# Patient Record
Sex: FEMALE | Race: OTHER | HISPANIC OR LATINO
[De-identification: names, ages, dates, MRNs, and addresses within clinical notes are randomized per-mention and may not be internally consistent; named-entity substitution may affect disease eponyms.]

---
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
## 2021-01-05 LAB — SURGICAL PATHOLOGY STUDY: SIGNIFICANT CHANGE UP

## 2021-01-15 ENCOUNTER — NON-APPOINTMENT (OUTPATIENT)
Age: 21
End: 2021-01-15

## 2021-02-01 ENCOUNTER — NON-APPOINTMENT (OUTPATIENT)
Age: 21
End: 2021-02-01

## 2021-02-18 NOTE — CHART NOTE - NSCHARTNOTEFT_GEN_A_CORE
I, Cally Mullins MD attest that, to the best of my knowledge based on clinical presentation and findings documented in the medical record, this patient had morbid obesity with a BMI of 40.

## 2023-05-30 NOTE — OB RN PATIENT PROFILE - FLU SEASON?
Consent 1/Introductory Paragraph: The rationale for Mohs was explained to the patient and consent was obtained. The risks, benefits and alternatives to therapy were discussed in detail. Specifically, the risks of infection, scarring, bleeding, prolonged wound healing, incomplete removal, allergy to anesthesia, nerve injury and recurrence were addressed. Prior to the procedure, the treatment site was clearly identified and confirmed by the patient. All components of Universal Protocol/PAUSE Rule completed. Yes...

## 2023-11-13 NOTE — OB RN TRIAGE NOTE - NS_VISITREASON1_OBGYN_ALL_OB
Discharge instructions    Continue present care  A little bit of protein such as a teaspoon small of peanut butter or healthy fat protein at bedtime to decrease risk of hypoglycemia in the mornings check sugar every other day in the morning every other day either before lunch before dinner or bedtime this is somewhat of a postprandial to help guide your eating patterns,  Continue present care everything will continue to improve should you start to have hypoglycemia sugars below 80 in the morning, we need to hold your long-acting insulin and likely discontinue  You could let me know after the fact    Otherwise continue present care  Update me some numbers in the morning in the next week or 2  And follow-up here in 6 weeks or so,    Outpatient lab nonfasting in 1 month a few days before your next appointment I will see you in 4 to 6 weeks  What ever works for you,  Outpatient lab [facility] outpatient lab at the [facility]  Or this office what ever works for you  
Labor at Term/Rupture of Membranes